# Patient Record
Sex: FEMALE | Race: WHITE
[De-identification: names, ages, dates, MRNs, and addresses within clinical notes are randomized per-mention and may not be internally consistent; named-entity substitution may affect disease eponyms.]

---

## 2019-11-01 ENCOUNTER — HOSPITAL ENCOUNTER (OUTPATIENT)
Dept: HOSPITAL 35 - GI | Age: 64
Discharge: HOME | End: 2019-11-01
Attending: SPECIALIST
Payer: COMMERCIAL

## 2019-11-01 VITALS — WEIGHT: 252 LBS | BODY MASS INDEX: 38.19 KG/M2 | HEIGHT: 67.99 IN

## 2019-11-01 DIAGNOSIS — K52.9: ICD-10-CM

## 2019-11-01 DIAGNOSIS — G20: ICD-10-CM

## 2019-11-01 DIAGNOSIS — Z79.899: ICD-10-CM

## 2019-11-01 DIAGNOSIS — K50.10: Primary | ICD-10-CM

## 2019-11-01 DIAGNOSIS — Z87.19: ICD-10-CM

## 2019-11-01 DIAGNOSIS — K21.9: ICD-10-CM

## 2019-11-01 DIAGNOSIS — Z90.710: ICD-10-CM

## 2019-11-01 DIAGNOSIS — K44.9: ICD-10-CM

## 2019-11-01 DIAGNOSIS — K29.70: ICD-10-CM

## 2019-11-01 DIAGNOSIS — R13.19: ICD-10-CM

## 2019-11-01 DIAGNOSIS — F32.9: ICD-10-CM

## 2019-11-01 DIAGNOSIS — Z98.890: ICD-10-CM

## 2019-11-01 DIAGNOSIS — K31.9: ICD-10-CM

## 2019-11-01 PROCEDURE — 62900: CPT

## 2019-11-01 PROCEDURE — 62110: CPT

## 2019-11-04 NOTE — P
[facility]
[address] [place]   [address]                     PROCEDURE REPORT              
_______________________________________________________________________________
 
Name:       YU YOON         Room #:                     REG [facility]#:      [id number]                       Account #:      [id number]  
Admission:  11/01/19    Attend Phys:    Leonard Pierre MD   
Discharge:              Date of Birth:  [birth year]  
                                                          Report #: [id number]
                                                                    [id number]   
_______________________________________________________________________________
THIS REPORT FOR:   //name//                          
 
CC: Leonard Vanessa MD PhD
    Lev Be MD
 
DATE OF SERVICE:  11/01/2019
 
 
BRIEF HISTORY:  The patient is a 64-year-old woman with history of recurrent
dysphagia.  She also has a history of Parkinson's disease.  She has been dilated
in the past with success.  She also has a history of reflux disease.
 
PREOPERATIVE DIAGNOSIS:  Recurrent dysphagia.
 
POSTOPERATIVE DIAGNOSES:
1.  Small hiatus hernia.
2.  Diffuse gastritis.
3.  Dysphagia.
 
MEDICATIONS:  Deep sedation with propofol per Anesthesia.
 
SPECIMEN:  None.
 
ESTIMATED BLOOD LOSS:  None.
 
PROCEDURE:  EGD with biopsy and Pfeiffer dilation.
 
FINDINGS:  Prior to propofol sedation, this procedure of upper endoscopy
discussed with the patient as well as potential risks and its complications. 
She indicates she understands and desires to proceed.
 
DESCRIPTION OF PROCEDURE:  With the patient in left lateral decubitus position,
the Olympus video endoscope was inserted in the cervical esophagus under direct
vision without difficulty.  Examination of this organ through its entire length
revealed normal esophageal mucosa down the squamocolumnar junction. 
Squamocolumnar junction was inspected and noted to be unremarkable. 
Intermittently, a small hiatus hernia was seen.  No strictures or masses were
seen.  Scope was advanced into the stomach, was examined on end view as well as
retroflexed views.  There was a moderately severe gastritis with a couple of
erosions in the antrum.  No ulcers or bleeding was seen.  Upon retroflexion, the
hiatus hernia was seen.  No other abnormalities were identified.  Biopsies were
obtained of the gastritis.  The pylorus, duodenal bulb and postbulbar areas
sweep were inspected and noted to be unremarkable.  At that point, the scope was
slowly withdrawn and careful circumferential views confirmed the above findings.
 
 
 
[facility]
[address] [place]   [address]                     PROCEDURE REPORT              
_______________________________________________________________________________
 
Name:       YU YOON         Room #:                     REG [facility].#:      [id number]                       Account #:      [id number]  
Admission:  11/01/19    Attend Phys:    Leonard Pierre MD   
Discharge:              Date of Birth:  [birth year]  
                                                          Report #: [id number]
                                                                    [id number]   
_______________________________________________________________________________
 The patient tolerated the procedure well.
 
The patient has been previously dilated, reports recurrent symptoms.  She was
dilated with passage of a 60-English Pfeiffer dilator.  There was no resistance
whatsoever.
 
CONDITION OF THE PATIENT UPON DISCHARGE:  Following procedure, the patient was
drowsy and prepared for colonoscopy.
 
INSTRUCTIONS TO THE PATIENT AND FAMILY AT THE TIME OF DISCHARGE:  The patient
has recurrent dysphagia.  She does have a history of Parkinson's disease, which
may be a factor.  If she does not have good resolution of symptoms, I would
suggest an esophageal manometry for further evaluation of an esophageal
dysmotility in view of Parkinson's.  She is to continue her current medications
for reflux disease.  Proceed with colonoscopy at this time.
 
 
 
 
 
 
 
 
 
 
 
 
 
 
 
 
 
 
 
 
 
 
 
 
 
 
 
 
 
  <ELECTRONICALLY SIGNED>
   By: Leonard Pierre MD           
  11/04/19     1153
D: 11/01/19 1015                           _____________________________________
T: 11/01/19 2135                           Leonard Pierre MD             /nt

## 2019-11-04 NOTE — P
[facility]
[address] [place]   [address]                     PROCEDURE REPORT              
_______________________________________________________________________________
 
Name:       YU YOON         Room #:                     REG [facility].#:      [id number]                       Account #:      [id number]  
Admission:  11/01/19    Attend Phys:    Leonard Pierre MD   
Discharge:              Date of Birth:  [birth year]  
                                                          Report #: [id number]
                                                                    [id number]   
_______________________________________________________________________________
THIS REPORT FOR:   //name//                          
 
CC: Leonard Vanessa MD PhD
    Lev Be MD
 
OUTPATIENT COLONOSCOPY REPORT 
 
BRIEF HISTORY:  The patient is a 64-year-old woman with history of Crohn's
disease, who has had a proctectomy who has been having more problems with her
stoma including herniation, and evidence of mucosal disease involving the
ostomy.  It is noteworthy that she stopped Remicade on her own last December
because she did not think she needed it anymore.  She has had abdominal pain and
bleeding through her stoma.
 
PREOPERATIVE DIAGNOSIS:  Crohn's disease with recurrent symptoms.
 
POSTOPERATIVE DIAGNOSES:  Active Crohn's disease involving the left colon, but
most severe in the sigmoid colon.
 
MEDICATIONS:  Deep sedation with propofol per anesthesia.
 
SPECIMENS:
1.  Random biopsies of cecum, ascending colon.
2.  Random biopsies, transverse colon.
3.  Random biopsies, descending colon.
4.  Random biopsies, sigmoid colon.
 
ESTIMATED BLOOD LOSS:  3 mL.
 
PROCEDURE:  Colonoscopy to cecum and terminal ileum via colostomy.
 
FINDINGS:  Prior to propofol sedation, procedure of colonoscopy was discussed
with the patient as well as potential risks and its complications.  She
indicates she understands and desires to proceed.
 
DESCRIPTION OF PROCEDURE:  With the patient in left lateral decubitus position,
digital examination of the ostomy was completed.  The mucosa was thick,
indurated and very friable.  The scope was advanced in the ostomy and advanced
under direct vision.  Unfortunately, the patient's prep was limited and there
were pools of liquidy material scattered about the colon.  Unfortunately, these
pools were too thick to allow complete removal of all the material.  However, we
were able to obtain reasonably good views of much of the mucosa.  As we withdrew
the scope, it was noted the mucosa in the distal terminal ileum was normal.  The
ileocecal valve was normal.  The mucosa was normal.  Random biopsies were
 
 
 
[facility]
[address] [place]   [address]                     PROCEDURE REPORT              
_______________________________________________________________________________
 
Name:       YU YOON         Room #:                     REG [facility].#:      [id number]                       Account #:      [id number]  
Admission:  11/01/19    Attend Phys:    Leonard Pierre MD   
Discharge:              Date of Birth:  [birth year]  
                                                          Report #: [id number]
                                                                    [id number]   
_______________________________________________________________________________
obtained until we reached the descending colon.  In the descending colon, there
was some loss of vascularity, but no rupesh ulceration.  Multiple biopsies were
obtained.  However, in the sigmoid colon, particularly the distal 20-25 cm from
the ostomy was moderately to severely ulcerated.  The closer we got to the
ostomy the more ulceration were noted.  No strictures or masses were seen, but
she does have very significant disease in the distal colon.  Scope was
withdrawn.  The patient tolerated the procedure well.
 
CONDITION OF THE PATIENT UPON DISCHARGE:  Following the procedure,  the patient
drowsy, aroused, conversant and will be discharged home when fully ambulatory.
 
INSTRUCTIONS TO THE PATIENT AND FAMILY AT THE TIME OF DISCHARGE:  The patient
has been seen by Dr. Claire Vanessa and surgery is being planned at this point in
time.  We will discuss with Dr. Vanessa.  The patient has been off of Remicade. 
She clearly needs long-term therapy.  She believes Remicade did not help her. 
We could consider switching to another agent such as Stelara or Entyvio.  We
will discuss further with Dr. Claire Vanessa.  Also, surgery is being planned.  I
am not sure of the time frame.  We will discuss with Dr. Vanessa.  In the
interval time, she may benefit from steroids or budesonide.
 
 
 
 
 
 
 
 
 
 
 
 
 
 
 
 
 
 
 
 
 
 
 
 
 
  <ELECTRONICALLY SIGNED>
   By: Leonard Pierre MD           
  11/04/19     1153
D: 11/01/19 1053                           _____________________________________
T: 11/02/19 0011                           Leonard Pierre MD             /nt

## 2019-11-05 NOTE — PATH
[facility]
[address]
[place]   [address]                     PATHOLOGY RPT PROCEDURE       
_______________________________________________________________________________
 
Name:       DEBBIE COOPER         Room #:                     REG [facility] 
M..#:      [id number]     Account #:      [id number]  
Admission:  11/01/19    Date of Birth:  [birth year]  
Discharge:                                              Report #:    [id number]
                                                        Path Case #: [id number]
_______________________________________________________________________________
 
[facility] Accession Number: [id number]
.                                                                01
Material submitted:                                        .
PART A: stomach - BIOPSY OF GASTRITIS TO RULE OUT H PYLORI
PART B: colon - RANDOM BIOPSY OF CECUM AND ASCENDING COLON. Modifiers:
ascending
PART C: colon - RANDOM BIOPSY OF TRANSVERSE COLON. Modifiers: transverse
PART D: colon - RANDOM BIOPSY OF DESCENDING COLON. Modifiers: descending
PART E: sigmoid colon - RANDOM BIOPSY OF SIGMOID COLON. Modifiers: RANDOM
.                                                                01
Clinical history:                                          .
History of Crohn's, hiatal hernia, gastritis, dysphagia
.                                                                02
**********************************************************************
Diagnosis:
A. Gastric mucosa, gastritis to rule out H. pylori, endoscopic biopsy:
- Moderate reactive gastropathy.
- Negative for intestinal metaplasia or atrophy.
- Negative for Helicobacter pylori (properly controlled
immunohistochemical stain performed).
.
B. Large intestinal mucosa, cecum to ascending colon, endoscopic biopsy:
- Mild chronic active colitis, history of Crohn's disease.
- Negative for dysplasia or malignancy.
.
C. Large intestinal mucosa, transverse colon, endoscopic biopsy:
- Mild to moderate chronic active colitis associated with rare
non-necrotizing granulomata, compatible with the history of Crohn's
disease.
- Negative for dysplasia or malignancy.
.
D. Large intestinal mucosa, descending colon, endoscopic biopsy:
- Mild to moderate chronic active colitis associated with rare
non-necrotizing granulomata, compatible with the history of Crohn's
disease.
- Negative for dysplasia or malignancy.
.
E. Large intestinal mucosa, sigmoid colon, endoscopic biopsy:
- Moderate to marked chronic active colitis with rare non-necrotizing
granulomata, compatible with a history of Crohn's disease.
- Negative for dysplasia or malignancy.
LBQ  11/05/2019  1249 Local
**********************************************************************
.                                                                02
Comment:
Parts B to D:
Examination shows an increasing intensity of inflammation (B-D) involving
 
 
[facility]
[address] [place]   [address]                     PATHOLOGY RPT PROCEDURE       
_______________________________________________________________________________
 
Name:       DEBBIE COOPER MAUREEN         Room #:                     REG [facility].#:      [id number]     Account #:      [id number]  
Admission:  11/01/19    Date of Birth:  [birth year]  
Discharge:                                              Report #:    [id number]
                                                        Path Case #: [id number]
_______________________________________________________________________________
the large intestine biopsy tissue fragments.  The inflammation within the
cecum to ascending colon biopsy tissue is milder with occasional to rare
architectural abnormalities and few foci of cryptitis.  The remainder
biopsy tissues; however, show markedly expanded lamina propria with
cryptitis in a few biopsy tissues, crypt abscess formation in others as
well as poorly formed non-necrotizing granulomata.  The provided history
of Crohn's disease is noted.  Findings are compatible with the same.
There is no dysplasia or malignancy present.
(IUV/db; 11/05/2019)
.                                                                02
Electronically signed:                                     .
Rosa M Crews MD, Pathologist
NPI- [id number]
.                                                                01
Gross description:                                         .
A. The specimen is received in formalin, labeled "Debbie Cooper,
biopsy of gastritis", are two irregular segments of tan soft tissue
measuring 0.5 cm in greatest dimension each.  Entirely submitted in A1.
.
B. The specimen is received in formalin, labeled "Debbie Cooper,
random BX of cecum and ascending colon", are few irregular fragments of
tan soft tissue measuring 0.5 x 0.5 x 0.2 cm in aggregate.  Entirely
submitted in B1.
.
C. The specimen is received in formalin, labeled "Debbie Cooper,
random biopsy of transverse colon", are few irregular fragments of tan
soft tissue measuring 0.7 x 0.3 x 0.1 cm in aggregate.  Entirely submitted
in C1.
.
D. The specimen is received in formalin, labeled "Debbie Cooper,
random biopsies of descending colon", are few irregular fragments of tan
soft tissue measuring 0.7 x 0.7 x 0.1 cm in aggregate.  Entirely submitted
in D1.
.
E. The specimen is received in formalin, labeled "Debbie Cooper,
random biopsies of sigmoid colon", are few irregular fragments of tan soft
tissue measuring 0.5 x 0.5 x 0.1 cm in aggregate.  Entirely submitted in
E1.
([facility]; 11/4/2019)
[facility]  11/04/2019  1752 Local
.                                                                02
Pathologist provided ICD-10:
K31.9, K52.9, K50.10
.                                                                02
CPT                                                        .
501291, 734178, 761412, 688784, 166172, D44010
Specimen Comment: A courtesy copy of this report has been sent to [phone]
Specimen Comment: Report sent to 
 
 
[address]   [address]                     PATHOLOGY RPT PROCEDURE       
_______________________________________________________________________________
 
Name:       DEBBIE COOPER         Room #:                     REG [facility] 
COMPA.R.#:      [id number]     Account #:      [id number]  
Admission:  11/01/19    Date of Birth:  [birth year]  
Discharge:                                              Report #:    [id number]
                                                        Path Case #: [id number]
_______________________________________________________________________________
***Performed at:  01
   [facility]
   [address], [place], [place]  [id number]
   MD Sukhi Hoang MD Phone:  [phone]
***Performed at:  02
   [facility]
   [address] [place], [place]  [id number]
   MD Rosa M Crews MD Phone:  [phone]